# Patient Record
Sex: FEMALE | Race: WHITE | Employment: FULL TIME | URBAN - METROPOLITAN AREA
[De-identification: names, ages, dates, MRNs, and addresses within clinical notes are randomized per-mention and may not be internally consistent; named-entity substitution may affect disease eponyms.]

---

## 2019-09-25 ENCOUNTER — APPOINTMENT (OUTPATIENT)
Dept: PHYSICAL THERAPY | Age: 39
End: 2019-09-25

## 2019-09-30 ENCOUNTER — HOSPITAL ENCOUNTER (OUTPATIENT)
Dept: PHYSICAL THERAPY | Age: 39
Discharge: HOME | End: 2019-09-30
Payer: COMMERCIAL

## 2019-09-30 PROCEDURE — 97535 SELF CARE MNGMENT TRAINING: CPT

## 2019-09-30 PROCEDURE — 97110 THERAPEUTIC EXERCISES: CPT

## 2019-09-30 PROCEDURE — 97161 PT EVAL LOW COMPLEX 20 MIN: CPT

## 2019-09-30 NOTE — PROGRESS NOTES
[address] PHYSICAL THERAPY  [address] [place] [address], [place] [address],[facility], [facility] - Phone: [phone]  Fax: [phone] / [phone] [facility]  Patient Name: Melchor Upton : 1980   Medical   Diagnosis: CRPS right foot, right foot pain Treatment Diagnosis: Foot pain, right [M79.671]   Onset Date: 3/10/19     Referral Source: [facility] Bustamante Start of Care [facility]): 2019   Prior Hospitalization: See medical history Provider #: [id number]   Prior Level of Function: Previous no pain or difficulty with walking, standing, age appropriate activity   Comorbidities: Latex allergy   Medications: Verified on Patient Summary List   The Plan of Care and following information is based on the information from the initial evaluation.   ===========================================================================================  Assessment / valencia information:  Melchor Upton is a 46 y.o.  yo female with Dx: CRPS right foot, right foot pain, who reports having her foot stepped on by her child on 3/10/19 then numbness and increased pain approximately 1 week later. This progressed to high pain and difficulty standing longer than 5 min. Pt rates pain as 8/10 max, 0/10 at best, 5/10 today, located generally at distal medial right foot/great toe and medial right foot. Pain increases with being on foot prolonged periods, can't stand over 1 hour. Pain improves with ice and nonWB. MRI taken in May showed tendinosis of peroneus longus and brusing of 1st MTP jt. Prior treatment includes lidocaine injections x4 starting 19, clonadine patch at medial foot with some relief, steriods, scooter, CAM boot x approx 7 weeks, PT approx 3 months, scheduled for symp nerve block in approx 1 week. .  Objective: FOTO score = 50 (an established functional score where 100 = no disability). Pt amb into clinic in walkings shoes with no noted deviations. Visual shows increased skin tone changes on right from ankle distally with brusing from injection on dorsal 1st MTP. Clonadine patch just distal to med mall. Trophic changs to 1st digit nail. AAROM ankle right DF 11, 19 with knee flexed; left 16, 29 with knee flexed; PF right 45, left 46; hallus DF right 60, left 70; hallux PF right 22, left 70. MMT slightly reduced for all planes on right. Noted inabilty to flex DIP (flexor digitorum longus) of toes on right unless in closed chain. If manually flexing DIPs able to hold approx 5 seconds. SL bal reduced <10 seconds on right with increased sway. Pt instructed in HEP including modification of current program, contrast bath, mirror and imagery therapy, desensitization. Pt with approximately only 8 visits remaining from insurance coverage, will progress on HEP basis with periodic followups.    ===========================================================================================  Eval Complexity: History LOW Complexity : Zero comorbidities / personal factors that will impact the outcome / POC;  Examination  HIGH Complexity : 4+ Standardized tests and measures addressing body structure, function, activity limitation and / or participation in recreation ; Presentation MEDIUM Complexity : Evolving with changing characteristics ;   Decision Making MEDIUM Complexity : FOTO score of 26-74; Overall Complexity LOW   Problem List: pain affecting function, decrease ROM, decrease strength, impaired gait/ balance, decrease ADL/ functional abilitiies and decrease activity tolerance FOTO = 50  Treatment Plan may include any combination of the following: Therapeutic exercise, Therapeutic activities, Neuromuscular re-education, Physical agent/modality, Gait/balance training, Manual therapy, Patient education and Self Care training  Patient / Family readiness to learn indicated by: asking questions, trying to perform skills and interest  Persons(s) to be included in education: patient (P)  Barriers to Learning/Limitations: no  Measures taken, if barriers to learning: N/A   Patient Goal (s): \"derease pain, increase activity tolerance\"   Patient self reported health status: good  Rehabilitation Potential: good   Short Term Goals: To be accomplished in  1-2  weeks:  1. Independent with HEP. 2. Improve ability to flex DIPs of foot actively and hold 10 seconds to show improved motor function and improve amb tolerance.  Long Term Goals: To be accomplished in  4-6  weeks:  1. Decrease max pain 25-50%% to assist with amb/stand > 1 hour. 2.  Improve FOTO Functional Status Score by 15 points in order to show significant functional improvement. 3.  Improve SL balance > 20 seconds with minimal sway to improve amb/safety in community. Frequency / Duration:   Patient to be seen  2-3  times per week for 4-6  weeks:  Patient / Caregiver education and instruction: self care and exercises  Therapist Signature: Celeste Alamo PT, DPT, OCS, CSCS Date: 4/74/8426   Certification Period: NA Time: 2:54 PM   ===========================================================================================  I certify that the above Physical Therapy Services are being furnished while the patient is under my care. I agree with the treatment plan and certify that this therapy is necessary. Physician Signature:        Date:       Time:     Please sign and return to [facility] or you may fax the signed copy to [phone]. Thank you.

## 2019-09-30 NOTE — PROGRESS NOTES
PHYSICAL THERAPY - DAILY TREATMENT NOTE    Patient Name: Sherman Oro        Date: 2019  : 1980   YES Patient  Verified  Visit #:     Insurance: Payor: Annie Carlson / Plan: [place] / Product Type: PPO /      In time: 2:54 Out time: 3:55   Total Treatment Time: 61     Medicare/[facility] Time Tracking (below)   Total Timed Codes (min):  na 1:1 Treatment Time:  na     TREATMENT AREA =  Right foot    SUBJECTIVE    Pain Level (on 0 to 10 scale):  5  / 10   Medication Changes/New allergies or changes in medical history, any new surgeries or procedures? NO    If yes, update Summary List   Subjective Functional Status/Changes:  []  No changes reported     See POC          OBJECTIVE    25 min Therapeutic Exercise:  [x]  See flow sheet   Rationale:      increase ROM, increase strength, improve coordination and reduce pain to improve the patients ability to amb, stand prolonged     10 min Self Care: Reviewed diagnosis, prognosis, therapy progression   Rationale:    Improve understanding of injury and therapy to have realistic expectation of therapy to improve compliance/adherence and satisfaction    Billed With/As:   [x] TE   [] TA   [] Neuro   [x] Self Care Patient Education: [x] Review HEP    [] Progressed/Changed HEP based on:   [] positioning   [] body mechanics   [] transfers   [] heat/ice application    [] other:        Other Objective/Functional Measures:    Shown and performed HEP     Post Treatment Pain Level (on 0 to 10) scale:   5 / 10     ASSESSMENT  Assessment/Changes in Function:     See POC     []  See Progress Note/Recertification   Patient will continue to benefit from skilled PT services to modify and progress therapeutic interventions, address functional mobility deficits, address ROM deficits, address strength deficits, analyze and address soft tissue restrictions, analyze and cue movement patterns and analyze and modify body mechanics/ergonomics to attain goals per POC.    Progress toward goals / Updated goals:    See POC     PLAN  [x]  Upgrade activities as tolerated YES Continue plan of care   []  Discharge due to :    []  Other: Will follow up in 2-3 weeks and reassess due to likely limits on remaining PT visits     Therapist: Steph Hernandez PT, DPT, OCS, CSCS    Date: 9/30/2019 Time: 2:53 PM

## 2019-10-24 ENCOUNTER — HOSPITAL ENCOUNTER (OUTPATIENT)
Dept: PHYSICAL THERAPY | Age: 39
Discharge: HOME | End: 2019-10-24
Payer: COMMERCIAL

## 2019-10-24 PROCEDURE — 97535 SELF CARE MNGMENT TRAINING: CPT

## 2019-10-24 PROCEDURE — 97110 THERAPEUTIC EXERCISES: CPT

## 2019-10-24 NOTE — PROGRESS NOTES
PHYSICAL THERAPY - DAILY TREATMENT NOTE    Patient Name: Moni Tripp        Date: 10/24/2019  : 1980   yes Patient  Verified  Visit #:     Insurance: Payor: Dora Stone / Plan: [address] [place] [address] [place] PPO / Product Type: PPO /      In time: 4:07 Out time: 4:57   Total Treatment Time: 50     Medicare/[facility] Time Tracking (below)   Total Timed Codes (min):  na 1:1 Treatment Time:  na     TREATMENT AREA =  Foot pain, right [M79.391]    SUBJECTIVE  Pain Level (on 0 to 10 scale):  3  / 10   Medication Changes/New allergies or changes in medical history, any new surgeries or procedures?    no  If yes, update Summary List   Subjective Functional Status/Changes:  []  No changes reported     Reports having symp nerve block on 10/7 and good decrease in pain now avg -5/10, mostly at 3/10. Does report some increase in left foot/ankle symptoms however. Reports improved ability to descend stairs, improved ability to actively flex toes          OBJECTIVE    15 min Therapeutic Exercise:  [x]  See flow sheet   Rationale:      increase ROM, increase strength and improve coordination to improve the patients ability to stand/amb prolonged     35 min Self Care: Discussed progress, symptoms, activities, appropriate exercises, modification of activity   Rationale:    discuss progress, activities etc to improve the patients ability to continue to progress with therapy and improve ADL, amb/activity tolerance. Billed With/As:   [x] TE   [] TA   [] Neuro   [x] Self Care Patient Education: [x] Review HEP    [] Progressed/Changed HEP based on:   [] positioning   [] body mechanics   [] transfers   [] heat/ice application    [] other:      Other Objective/Functional Measures:    abiilth to actively flex toes approx 50% of left foot, still not full    AAROM ankle DF knee ext: right 17 deg, left 17 deg;   DF knee flex: right 18 deg, left 24 deg; PF right 54 deg, left 45 deg. Hallux DF right 74, left 68, PF right 30, left 53.     Single leg Balance still reduced with eyes closed, min sway with eyes open    Some noted gait/trendelenberg compensation with carrying 10lb dumbell in right hand vs left. Tested hip MMT and slightly reduced on right comapred to left, given standing hip abd exercise to assist with that    Given NMES loaner as she has not yet received it from physician recommendation yet. Post Treatment Pain Level (on 0 to 10) scale:   3  / 10     ASSESSMENT  Assessment/Changes in Function:     Normalized DF gastroc ROM on rigth, still some limit with soleus; still some digital PF reduction but improved. Pain levels much improved. []  See Progress Note/Recertification   Patient will continue to benefit from skilled PT services to modify and progress therapeutic interventions, address functional mobility deficits, address ROM deficits, address strength deficits, analyze and address soft tissue restrictions, analyze and cue movement patterns and analyze and modify body mechanics/ergonomics to attain remaining goals. Progress toward goals / Updated goals:    STG 2 met, improved ability to flex digits of right foot and hold still not full ROM yet     PLAN  [x]  Upgrade activities as tolerated yes Continue plan of care   []  Discharge due to :    []  Other: Will follow up in 3-4 weeks for recheck adjust HEP.      Therapist: Shelli Jean PT, DPT, OCS, CSCS    Date: 10/24/2019 Time: 5:01 PM     Future Appointments   Date Time Provider Jay Bynum   11/21/2019  4:00 PM Katty Riley, PT [facility]

## 2019-11-21 ENCOUNTER — HOSPITAL ENCOUNTER (OUTPATIENT)
Dept: PHYSICAL THERAPY | Age: 39
Discharge: HOME | End: 2019-11-21
Payer: COMMERCIAL

## 2019-11-21 PROCEDURE — 97535 SELF CARE MNGMENT TRAINING: CPT

## 2019-11-21 PROCEDURE — 97110 THERAPEUTIC EXERCISES: CPT

## 2019-11-21 NOTE — PROGRESS NOTES
PHYSICAL THERAPY - DAILY TREATMENT NOTE    Patient Name: Ric Miller        Date: 2019  : 1980   yes Patient  Verified  Visit #:   3   of   12  Insurance: Payor: Miladis Starkey / Plan: Raymond Boston PPO / Product Type: PPO /      In time: 4:02 Out time: 4:32   Total Treatment Time: 30     Medicare/[facility] Time Tracking (below)   Total Timed Codes (min):  na 1:1 Treatment Time:  na     TREATMENT AREA =  Foot pain, right [M79.841]    SUBJECTIVE  Pain Level (on 0 to 10 scale):  3  / 10   Medication Changes/New allergies or changes in medical history, any new surgeries or procedures?    no  If yes, update Summary List   Subjective Functional Status/Changes:  []  No changes reported     Had second block , starting to wear off now; pain prior was 7/10 max now 3-4/10 max; reports improved tolerance to walking/standing/wearing shoes. Continueing to do contrast bath and HEP, MD has adjusted meds some; made an attempt to jog across street but had pain/difficulty       OBJECTIVE    20 min Therapeutic Exercise:  [x]  See flow sheet   Rationale:      increase ROM, increase strength and reduce pain to improve the patients ability to amb/stand     10 min Self Care: Reviewed activity modification, progression of exs, expectations   Rationale:    adjust activity and set expectation levels to improve the patients ability to progress with therapy    Billed With/As:   [x] TE   [] TA   [] Neuro   [] Self Care Patient Education: [x] Review HEP    [] Progressed/Changed HEP based on:   [] positioning   [] body mechanics   [] transfers   [] heat/ice application    [] other:      Other Objective/Functional Measures:    AAROM ankle DF right 13/24 deg, left 17/24 deg; PF right 42  Deg.   Right hallux DF 75, hallux PF 30  Full AROM hallux PF much improved from eval  Given heel raise progression  FOTO = 52   Post Treatment Pain Level (on 0 to 10) scale:   3  / 10     ASSESSMENT  Assessment/Changes in Function:     See PN     []  See Progress Note/Recertification   Patient will continue to benefit from skilled PT services to modify and progress therapeutic interventions, address functional mobility deficits, address strength deficits, analyze and address soft tissue restrictions, analyze and cue movement patterns and analyze and modify body mechanics/ergonomics to attain remaining goals. Progress toward goals / Updated goals:    See PN     PLAN  []  Upgrade activities as tolerated yes Continue plan of care   []  Discharge due to :    []  Other: Will f/u in another 3-4 weeks for recheck and adjust HEP prn     Therapist: Ingrid Fonseca PT, DPT, OCS, CSCS    Date: 11/21/2019 Time: 4:43 PM     No future appointments.

## 2019-11-21 NOTE — PROGRESS NOTES
[address] PHYSICAL THERAPY  [address] [place] [address] [address], [address],Lyly Pruett, [address] [employer] - Phone: [phone]  Fax: [phone]  PROGRESS NOTE  Patient Name: Gloria Fishman : 1980   Treatment/Medical Diagnosis: Foot pain, right [M79.671]   Referral Source: [facility] Ruiz     Date of Initial Visit: 19 Attended Visits: 3 Missed Visits: 0     SUMMARY OF TREATMENT  Has consisted of initial evaluation, HEP, and periodic rechecks with updated HEP. CURRENT STATUS  Gloria Fishman has made improvements since beginning therapy. She reports adherence to HEP including contrast bath, desensitization, Estim and activity modification. She reports nerve block from  had \"started to wear off\" on  but pain was still improved from 7/10 prior to 3-4/10 currently. She reports improved tolerance to walking/activity and footwear. Did have an episode of attempting to jog across the street which was painful/difficult. She now has full AROM and strength of intinsic muscles of the foot including hallux flexion which was extremely weak/limited on IE. Goal/Measure of Progress Goal Met? 1. Decrease max pain 25-50%% to assist with amb/stand > 1 hour. Status at last Eval: Pain = 8/10 max Current Status: Pain = 3-4/10 recently yes   2. Improve FOTO Functional Status Score by 15 points in order to show significant functional improvement. Status at last Eval: FOTO = 50 Current Status: 52 no   3. Improve SL balance > 20 seconds with minimal sway to improve amb/safety in community. Status at last Eval: <10 seconds on right with increased sway Current Status: Continued sway but improved tolerance and function subjectively with activity Progressing     New Goals to be achieved in __3-4__  Weeks:  1. Cont goal #2 above  2.  Will have good tolerance and follow progression of heel raises for improved strength of right LE to improve tolerance to prolonged ambulation. RECOMMENDATIONS  We would like to continue therapy for 3-4 visits for periodic rechecks and update HEP as needed. If you have any questions/comments please contact us directly at [phone]. Thank you for allowing us to assist in the care of your patient. Therapist Signature: Malathi Suazo PT, DPT, OCS, CSCS Date: 11/21/2019     Time: 4:44 PM   NOTE TO PHYSICIAN:  PLEASE COMPLETE THE ORDERS BELOW AND FAX TO   [facility]: [phone]  If you are unable to process this request in 24 hours please contact our office: [phone]    ___ I have read the above report and request that my patient continue as recommended.   ___ I have read the above report and request that my patient continue therapy with the following changes/special instructions:_________________________________________________________   ___ I have read the above report and request that my patient be discharged from therapy.      Physician Signature:        Date:       Time:

## 2020-02-25 NOTE — PROGRESS NOTES
Kely Bronson [address] [facility] THERAPY  [address],[facility], [address] [facility] [address] - Phone: [phone]  Fax: [phone]    DISCHARGE NOTE  Patient Name: Lester Red : 1980   Treatment/Medical Diagnosis: Foot pain, right [M79.671]   Referral Source: [facility] Quintanilla     Date of Initial Visit: 19 Attended Visits: 3 Missed Visits: 0       SUMMARY OF TREATMENT  Pt attended initial evaluation and     3     follow-ups for periodic rechecks and update of HEP. She did not return after visit on 19 and therefore no changes to report from progress note written on 19. RECOMMENDATIONS  Discontinue physical therapy due to patient not returning. If you have any questions/comments please contact us directly at [phone]. Thank you for allowing us to assist in the care of your patient.     Therapist Signature: Pietro Chahal PT, DPT, OCS, CSCS Date: 19     Time: 7:00pm

## 2023-05-04 ENCOUNTER — HOSPITAL ENCOUNTER (OUTPATIENT)
Facility: HOSPITAL | Age: 43
Setting detail: RECURRING SERIES
Discharge: HOME | End: 2023-05-07
Payer: COMMERCIAL

## 2023-05-04 PROCEDURE — 97162 PT EVAL MOD COMPLEX 30 MIN: CPT

## 2023-05-19 ENCOUNTER — HOSPITAL ENCOUNTER (OUTPATIENT)
Facility: HOSPITAL | Age: 43
Setting detail: RECURRING SERIES
Discharge: HOME | End: 2023-05-22
Payer: COMMERCIAL

## 2023-05-19 PROCEDURE — 97110 THERAPEUTIC EXERCISES: CPT

## 2023-05-19 PROCEDURE — 97535 SELF CARE MNGMENT TRAINING: CPT

## 2023-05-19 PROCEDURE — 97112 NEUROMUSCULAR REEDUCATION: CPT

## 2023-05-23 ENCOUNTER — HOSPITAL ENCOUNTER (OUTPATIENT)
Facility: HOSPITAL | Age: 43
Setting detail: RECURRING SERIES
Discharge: HOME | End: 2023-05-26
Payer: COMMERCIAL

## 2023-05-23 PROCEDURE — 97110 THERAPEUTIC EXERCISES: CPT

## 2023-05-23 PROCEDURE — 97112 NEUROMUSCULAR REEDUCATION: CPT

## 2023-05-23 NOTE — PROGRESS NOTES
PHYSICAL / OCCUPATIONAL THERAPY - DAILY TREATMENT NOTE (updated )    Patient Name: Janis Schmitt    Date: 2023    : 1980  Insurance: Payor: Genoveva Brice / Plan: OPTIMA PPO / Product Type: *No Product type* /      Patient  verified Yes     Visit #   Current / Total 3 12   Time   In / Out 1020 1100   Pain   In / Out 0 0   Subjective Functional Status/Changes: Says was sore the next day, was taking aleve bc of the soreness in the muscles. Says the low back and cocyx is way better. She's doing the HEP and that has been helping. Changes to:  Meds, Allergies, Med Hx, Sx Hx? If yes, update Summary List no       TREATMENT AREA =  Other low back pain [M54.59]    OBJECTIVE    Therapeutic Procedures: Tx Min Billable or 1:1 Min (if diff from Tx Min) Procedure, Rationale, Specifics   30  71037 Therapeutic Exercise (timed):  increase ROM, strength, coordination, balance, and proprioception to improve patient's ability to progress to PLOF and address remaining functional goals. (see flow sheet as applicable)     Details if applicable:       10  55516 Neuromuscular Re-Education (timed):  improve balance, coordination, kinesthetic sense, posture, core stability and proprioception to improve patient's ability to develop conscious control of individual muscles and awareness of position of extremities in order to progress to PLOF and address remaining functional goals. (see flow sheet as applicable)     Details if applicable:       09711 Manual Therapy (timed):  decrease pain, increase ROM, increase tissue extensibility, decrease edema, correct positional vertigo, decrease trigger points, and increase postural awareness to improve patient's ability to progress to PLOF and address remaining functional goals. The manual therapy interventions were performed at a separate and distinct time from the therapeutic activities interventions .  (see flow sheet as applicable)     Details if applicable:       73066 Therapeutic

## 2023-05-24 ENCOUNTER — HOSPITAL ENCOUNTER (OUTPATIENT)
Facility: HOSPITAL | Age: 43
Setting detail: RECURRING SERIES
Discharge: HOME | End: 2023-05-27
Payer: COMMERCIAL

## 2023-05-24 PROCEDURE — 97112 NEUROMUSCULAR REEDUCATION: CPT

## 2023-05-24 PROCEDURE — 97110 THERAPEUTIC EXERCISES: CPT

## 2023-05-24 NOTE — PROGRESS NOTES
6/19/2023 11:40 AM Sha Whittington, PT [facility]   6/26/2023 11:40 AM Sha Whittington, PT [facility]   6/29/2023 11:00 AM Hawk Weber, PT [facility] [id number]

## 2023-05-31 ENCOUNTER — HOSPITAL ENCOUNTER (OUTPATIENT)
Facility: HOSPITAL | Age: 43
Setting detail: RECURRING SERIES
Discharge: HOME | End: 2023-06-03
Payer: COMMERCIAL

## 2023-05-31 PROCEDURE — 97140 MANUAL THERAPY 1/> REGIONS: CPT

## 2023-05-31 PROCEDURE — 97112 NEUROMUSCULAR REEDUCATION: CPT

## 2023-05-31 NOTE — PROGRESS NOTES
PHYSICAL / OCCUPATIONAL THERAPY - DAILY TREATMENT NOTE (updated )    Patient Name: Sammy Sinha    Date: 2023    : 1980  Insurance: Payor: Merissa Serrano / Plan: OPTIMA PPO / Product Type: *No Product type* /      Patient  verified Yes     Visit #   Current / Total 5 12   Time   In / Out 1:00 1:40   Pain   In / Out 2 0   Subjective Functional Status/Changes: Says the leg pain went away. She is using the tart cherry juice. Changes to:  Meds, Allergies, Med Hx, Sx Hx? If yes, update Summary List no       TREATMENT AREA =  Other low back pain [M54.59]    OBJECTIVE    Therapeutic Procedures: Tx Min Billable or 1:1 Min (if diff from Tx Min) Procedure, Rationale, Specifics     03121 Therapeutic Exercise (timed):  increase ROM, strength, coordination, balance, and proprioception to improve patient's ability to progress to PLOF and address remaining functional goals. (see flow sheet as applicable)     Details if applicable:       10  40001 Neuromuscular Re-Education (timed):  improve balance, coordination, kinesthetic sense, posture, core stability and proprioception to improve patient's ability to develop conscious control of individual muscles and awareness of position of extremities in order to progress to PLOF and address remaining functional goals. (see flow sheet as applicable)     Details if applicable:     30  68087 Manual Therapy (timed):  decrease pain, increase ROM, increase tissue extensibility, decrease edema, correct positional vertigo, decrease trigger points, and increase postural awareness to improve patient's ability to progress to PLOF and address remaining functional goals. The manual therapy interventions were performed at a separate and distinct time from the therapeutic activities interventions .  (see flow sheet as applicable)     Details if applicable:  Prone: central sacral PA, coccygeal PA and trunk MFR/distraction,      14501 Therapeutic Activity (timed):  use of dynamic

## 2023-06-02 ENCOUNTER — HOSPITAL ENCOUNTER (OUTPATIENT)
Facility: HOSPITAL | Age: 43
Setting detail: RECURRING SERIES
Discharge: HOME | End: 2023-06-05
Payer: COMMERCIAL

## 2023-06-02 PROCEDURE — 97110 THERAPEUTIC EXERCISES: CPT

## 2023-06-02 PROCEDURE — 97140 MANUAL THERAPY 1/> REGIONS: CPT

## 2023-06-02 PROCEDURE — 97112 NEUROMUSCULAR REEDUCATION: CPT

## 2023-06-05 ENCOUNTER — HOSPITAL ENCOUNTER (OUTPATIENT)
Facility: HOSPITAL | Age: 43
Setting detail: RECURRING SERIES
Discharge: HOME | End: 2023-06-08
Payer: COMMERCIAL

## 2023-06-05 PROCEDURE — 97112 NEUROMUSCULAR REEDUCATION: CPT

## 2023-06-05 PROCEDURE — 97140 MANUAL THERAPY 1/> REGIONS: CPT

## 2023-06-05 PROCEDURE — 97110 THERAPEUTIC EXERCISES: CPT

## 2023-06-05 NOTE — PROGRESS NOTES
movements to increase ROM, strength, coordination, balance, and proprioception in order to improve patient's ability to progress to PLOF and address remaining functional goals. (see flow sheet as applicable)     Details if applicable:     -  96819 Self Care/Home Management (timed):  improve patient knowledge and understanding of pain reducing techniques, positioning, posture/ergonomics, home safety, activity modification, diagnosis/prognosis, and physical therapy expectations, procedures and progression  to improve patient's ability to progress to PLOF and address remaining functional goals. (see flow sheet as applicable)     Details if applicable:  Discussed POC and HEP compliance   40  [facility] Totals Reminder: bill using total billable min of TIMED therapeutic procedures (example: do not include dry needle or estim unattended, both untimed codes, in totals to left)  8-22 min = 1 unit; 23-37 min = 2 units; 38-52 min = 3 units; 53-67 min = 4 units; 68-82 min = 5 units   Total Total     [x]  Patient Education billed concurrently with other procedures   [x] Review HEP    [x] Progressed/Changed HEP, detail:  Access Code: [email or web address]  URL: [email or web address]  Date: 05/19/2023 Prepared by: Carlita Morales    [] Other detail:       Objective Information/Functional Measures/Assessment    Took patient through active trunk motions and hip/core drills. Good response to session. Patient will continue to benefit from skilled PT / OT services to modify and progress therapeutic interventions, analyze and address functional mobility deficits, analyze and address ROM deficits, analyze and address strength deficits, analyze and address soft tissue restrictions, analyze and cue for proper movement patterns, analyze and modify for postural abnormalities, analyze and address imbalance/dizziness, and instruct in home and community integration to address functional deficits and attain remaining goals.     Progress

## 2023-06-07 ENCOUNTER — HOSPITAL ENCOUNTER (OUTPATIENT)
Facility: HOSPITAL | Age: 43
Setting detail: RECURRING SERIES
Discharge: HOME | End: 2023-06-10
Payer: COMMERCIAL

## 2023-06-07 PROCEDURE — 97110 THERAPEUTIC EXERCISES: CPT

## 2023-06-07 PROCEDURE — 97530 THERAPEUTIC ACTIVITIES: CPT

## 2023-06-07 PROCEDURE — 97140 MANUAL THERAPY 1/> REGIONS: CPT

## 2023-06-07 NOTE — PROGRESS NOTES
PHYSICAL / OCCUPATIONAL THERAPY - DAILY TREATMENT NOTE (updated )    Patient Name: Kinjal Sena    Date: 2023    : 1980  Insurance: Payor: Temo Ibrahim / Plan: [employer]IM[employer] PPO / Product Type: *No Product type* /      Patient  verified Yes     Visit #   Current / Total 8 12   Time   In / Out 1:00 1:40   Pain   In / Out 1 0   Subjective Functional Status/Changes: Saw her Pain Management NP. Got referral for Pelvic Floor Assessment. Says she will have an MRI next Friday of the pelvis and may receive an injection. Changes to:  Meds, Allergies, Med Hx, Sx Hx? If yes, update Summary List no       TREATMENT AREA =  Other low back pain [M54.59]    OBJECTIVE    Therapeutic Procedures: Tx Min Billable or 1:1 Min (if diff from Tx Min) Procedure, Rationale, Specifics   20  63845 Therapeutic Exercise (timed):  increase ROM, strength, coordination, balance, and proprioception to improve patient's ability to progress to PLOF and address remaining functional goals. (see flow sheet as applicable)     Details if applicable:       10  01686 Neuromuscular Re-Education (timed):  improve balance, coordination, kinesthetic sense, posture, core stability and proprioception to improve patient's ability to develop conscious control of individual muscles and awareness of position of extremities in order to progress to PLOF and address remaining functional goals. (see flow sheet as applicable)     Details if applicable:     10  55118 Manual Therapy (timed):  decrease pain, increase ROM, increase tissue extensibility, decrease edema, correct positional vertigo, decrease trigger points, and increase postural awareness to improve patient's ability to progress to PLOF and address remaining functional goals. The manual therapy interventions were performed at a separate and distinct time from the therapeutic activities interventions .  (see flow sheet as applicable)     Details if applicable:  Prone: central sacral PA, coccygeal PA and

## 2023-06-19 ENCOUNTER — HOSPITAL ENCOUNTER (OUTPATIENT)
Facility: HOSPITAL | Age: 43
Setting detail: RECURRING SERIES
Discharge: HOME | End: 2023-06-22
Payer: COMMERCIAL

## 2023-06-19 PROCEDURE — 97140 MANUAL THERAPY 1/> REGIONS: CPT

## 2023-06-19 PROCEDURE — 90912 BFB TRAINING 1ST 15 MIN: CPT

## 2023-06-19 NOTE — PROGRESS NOTES
PHYSICAL / OCCUPATIONAL THERAPY - DAILY TREATMENT NOTE (updated )    Patient Name: Lazaro Pereira    Date: 2023    : 1980  Insurance: Payor: [employer] / Plan: [employer] PPO / Product Type: *No Product type* /      Patient  verified Yes     Visit #   Current / Total 2 8   Time   In / Out 11:50 12:20   Pain   In / Out 0 0   Subjective Functional Status/Changes: Patient reports doing PF exercises 3x day. No change in familiar pain. TREATMENT AREA =  R39.89  Other signs and symptoms of genitourinary system     OBJECTIVE         Therapeutic Procedures: Tx Min Billable or 1:1 Min (if diff from Tx Min) Procedure, Rationale, Specifics   15  93788 Biofeedback Training, initial 15 (timed): Improve pelvic floor muscle contraction/relaxation in order to progress PLOF and address remaining functional goals. Details if applicable:       5  98098 Biofeedback Training, add'l 15 (timed): Improve pelvic floor muscle contraction/relaxation in order to progress PLOF and address remaining functional goals. Details if applicable:     10  69521 Manual Therapy (timed):  decrease pain to improve patient's ability to progress to PLOF and address remaining functional goals. The manual therapy interventions were performed at a separate and distinct time from the therapeutic activities interventions . (see flow sheet as applicable)     Details if applicable:  Patient presents with no tenderness to palpation over bilateral puborectalis, coccygeus, iliococcygeus or piriformis muscles. No tenderness over coccyx. Grade 1 AP glides to coccyx.            Details if applicable:            Details if applicable:     27  [facility] Totals Reminder: bill using total billable min of TIMED therapeutic procedures (example: do not include dry needle or estim unattended, both untimed codes, in totals to left)  8-22 min = 1 unit; 23-37 min = 2 units; 38-52 min = 3 units; 53-67 min = 4 units; 68-82 min = 5 units   Total Total     [x]

## 2023-06-21 ENCOUNTER — APPOINTMENT (OUTPATIENT)
Facility: HOSPITAL | Age: 43
End: 2023-06-21
Payer: COMMERCIAL

## 2023-06-26 ENCOUNTER — HOSPITAL ENCOUNTER (OUTPATIENT)
Facility: HOSPITAL | Age: 43
Setting detail: RECURRING SERIES
Discharge: HOME | End: 2023-06-29
Payer: COMMERCIAL

## 2023-06-26 PROCEDURE — 90913 BFB TRAINING EA ADDL 15 MIN: CPT

## 2023-06-26 PROCEDURE — 97140 MANUAL THERAPY 1/> REGIONS: CPT

## 2023-06-26 PROCEDURE — 90912 BFB TRAINING 1ST 15 MIN: CPT

## 2023-06-29 ENCOUNTER — HOSPITAL ENCOUNTER (OUTPATIENT)
Facility: HOSPITAL | Age: 43
Setting detail: RECURRING SERIES
End: 2023-06-29
Payer: COMMERCIAL

## 2023-06-29 PROCEDURE — 97530 THERAPEUTIC ACTIVITIES: CPT

## 2023-06-29 PROCEDURE — 97112 NEUROMUSCULAR REEDUCATION: CPT

## 2023-06-29 PROCEDURE — 97535 SELF CARE MNGMENT TRAINING: CPT

## 2023-06-29 PROCEDURE — 97110 THERAPEUTIC EXERCISES: CPT

## 2023-07-03 ENCOUNTER — HOSPITAL ENCOUNTER (OUTPATIENT)
Facility: HOSPITAL | Age: 43
Setting detail: RECURRING SERIES
Discharge: HOME | End: 2023-07-06
Payer: COMMERCIAL

## 2023-07-03 PROCEDURE — 97535 SELF CARE MNGMENT TRAINING: CPT

## 2023-07-03 PROCEDURE — 97140 MANUAL THERAPY 1/> REGIONS: CPT

## 2023-07-03 NOTE — PROGRESS NOTES
Review HEP    [x] Progressed/Changed HEP, detail: advance  pelvic floor HEP to 2x day in supine position, 1x day in seated position with 5 second slow twitch contractions. [] Other detail:       Objective Information/Functional Measures/Assessment    ***    Patient will continue to benefit from skilled PT / OT services to modify and progress therapeutic interventions, analyze and address functional mobility deficits, analyze and address strength deficits, and instruct in home and community integration to address functional deficits and attain remaining goals.     Progress toward goals / Updated goals:  []  See Progress Note/Recertification    ***    Next PN/ RC due 7/12/2023    PLAN  Yes  Continue plan of care  [x]  Upgrade activities as tolerated  []  Discharge due to :  []  Other:    Sindy Ron PT    7/3/2023    12:230 PM    Future Appointments   Date Time Provider [address]   7/3/2023 11:40 AM Sindy Ron PT [facility]   7/12/2023  1:40 PM Sindy Ron PT [facility]

## 2023-07-12 ENCOUNTER — HOSPITAL ENCOUNTER (OUTPATIENT)
Facility: HOSPITAL | Age: 43
Setting detail: RECURRING SERIES
Discharge: HOME | End: 2023-07-15
Payer: COMMERCIAL

## 2023-07-12 PROCEDURE — 97535 SELF CARE MNGMENT TRAINING: CPT

## 2023-07-12 PROCEDURE — 90912 BFB TRAINING 1ST 15 MIN: CPT

## 2023-07-12 PROCEDURE — 90913 BFB TRAINING EA ADDL 15 MIN: CPT

## 2023-07-12 NOTE — THERAPY DISCHARGE
[facility] - Ph: [phone]  Fx: [phone]  DISCHARGE SUMMARY  Patient Name: Loren Conrad : 1980   Treatment/Medical Diagnosis: Other symptoms and signs involving the genitourinary system [R39.89]   Referral Source: [facility]     Date of Initial Visit: *** Attended Visits: *** Missed Visits: ***     SUMMARY OF TREATMENT  PT has consisted of pelvic floor relaxation/strengthening via biofeedback, education as to pelvic floor anatomy and function/***, manual therapy and home exercise program.     CURRENT STATUS  Patient has made *** progress in PT with *** term goals *** met. Functional progress  Includes ***. Patient independent in HEP. Status at last Eval: ***  Current Status: ***  Goal Met? {Yes/No-Ex:042888}    2. Patient will report *** improvement in pain with ***. Status at last Eval: ***                   Current Status: ***  Goal Met? {Yes/No-Ex:309921}    3. Decrease score on FOTO/*** to *** to indicate improved quality of life. Status at last Eval: ***  Current Status: ***  Goal Met? {Yes/No-Ex:291203}    4. 4) Patient will increase net rise of *** twitch contraction to *** microvolts  to increase  ***. Status at last Eval: ***  Current Status: ***  Goal Met? {Yes/No-Ex:907134}    Reporting Period (date from last assessment to current assessment): ***    RECOMMENDATIONS  {IN MOTION DC RECOMMENDATIONS:31900}    If you have any questions/comments please contact us directly at [phone]. Thank you for allowing us to assist in the care of your patient.     Chloe Burnett, PT       2023       4:26 PM

## 2023-07-12 NOTE — PROGRESS NOTES
PHYSICAL / OCCUPATIONAL THERAPY - DAILY TREATMENT NOTE (updated )    Patient Name: Cathy Foreman    Date: 2023    : 1980  Insurance: Payor: Richardson Woodard / Plan: [employer]IM[employer] PPO / Product Type: *No Product type* /      Patient  verified Yes     Visit #   Current / Total 5 8   Time   In / Out 1:53 2:27   Pain   In / Out 2 2   Subjective Functional Status/Changes: See D/C note     TREATMENT AREA =  Other symptoms and signs involving the genitourinary system [R39.89]    OBJECTIVE         Therapeutic Procedures: Tx Min Billable or 1:1 Min (if diff from Tx Min) Procedure, Rationale, Specifics   10  71580 Self Care/Home Management (timed):  improve patient knowledge and understanding of discharge instructions  to improve patient's ability to progress to PLOF and address remaining functional goals. (see flow sheet as applicable)     Details if applicable:       15  79602 Biofeedback Training, initial 15 (timed): Improve pelvic floor muscle contraction/relaxation in order to progress PLOF and address remaining functional goals. Details if applicable:     9  00164 Biofeedback Training, add'l 15 (timed): Improve pelvic floor muscle contraction/relaxation in order to progress PLOF and address remaining functional goals.        Details if applicable:            Details if applicable:            Details if applicable:     29  [facility] Totals Reminder: bill using total billable min of TIMED therapeutic procedures (example: do not include dry needle or estim unattended, both untimed codes, in totals to left)  8-22 min = 1 unit; 23-37 min = 2 units; 38-52 min = 3 units; 53-67 min = 4 units; 68-82 min = 5 units   Total Total     [x]  Patient Education billed concurrently with other procedures   [x] Review HEP    [] Progressed/Changed HEP, detail:    [] Other detail:       Objective Information/Functional Measures/Assessment    See D/C note      PLAN  No  Continue plan of care  []  Upgrade activities as tolerated  [x]